# Patient Record
Sex: FEMALE | Race: ASIAN | NOT HISPANIC OR LATINO | URBAN - METROPOLITAN AREA
[De-identification: names, ages, dates, MRNs, and addresses within clinical notes are randomized per-mention and may not be internally consistent; named-entity substitution may affect disease eponyms.]

---

## 2018-06-11 ENCOUNTER — APPOINTMENT (RX ONLY)
Dept: URBAN - METROPOLITAN AREA CLINIC 81 | Facility: CLINIC | Age: 33
Setting detail: DERMATOLOGY
End: 2018-06-11

## 2018-06-11 DIAGNOSIS — L91.0 HYPERTROPHIC SCAR: ICD-10-CM

## 2018-06-11 PROBLEM — D23.12 OTHER BENIGN NEOPLASM OF SKIN OF LEFT EYELID, INCLUDING CANTHUS: Status: ACTIVE | Noted: 2018-06-11

## 2018-06-11 PROBLEM — D23.39 OTHER BENIGN NEOPLASM OF SKIN OF OTHER PARTS OF FACE: Status: ACTIVE | Noted: 2018-06-11

## 2018-06-11 PROBLEM — D23.5 OTHER BENIGN NEOPLASM OF SKIN OF TRUNK: Status: ACTIVE | Noted: 2018-06-11

## 2018-06-11 PROBLEM — L85.3 XEROSIS CUTIS: Status: ACTIVE | Noted: 2018-06-11

## 2018-06-11 PROBLEM — F41.9 ANXIETY DISORDER, UNSPECIFIED: Status: ACTIVE | Noted: 2018-06-11

## 2018-06-11 PROCEDURE — ? ADDITIONAL NOTES

## 2018-06-11 PROCEDURE — 99203 OFFICE O/P NEW LOW 30 MIN: CPT

## 2018-06-11 PROCEDURE — ? COUNSELING

## 2018-06-11 ASSESSMENT — LOCATION ZONE DERM: LOCATION ZONE: ARM

## 2018-06-11 ASSESSMENT — LOCATION SIMPLE DESCRIPTION DERM: LOCATION SIMPLE: LEFT SHOULDER

## 2018-06-11 ASSESSMENT — LOCATION DETAILED DESCRIPTION DERM: LOCATION DETAILED: LEFT ANTERIOR SHOULDER

## 2018-06-11 NOTE — PROCEDURE: ADDITIONAL NOTES
Additional Notes: Lesion is 0.8cm.  Appears benign. No atypia.  1st noticed 1 year ago. \\n\\nDiscussed bx. Pt opted to watch and wait due to keloid history.\\n\\nPt to f/u if changes.

## 2022-03-22 ENCOUNTER — APPOINTMENT (RX ONLY)
Dept: URBAN - METROPOLITAN AREA CLINIC 80 | Facility: CLINIC | Age: 37
Setting detail: DERMATOLOGY
End: 2022-03-22

## 2022-03-22 DIAGNOSIS — L81.4 OTHER MELANIN HYPERPIGMENTATION: ICD-10-CM

## 2022-03-22 DIAGNOSIS — D22 MELANOCYTIC NEVI: ICD-10-CM

## 2022-03-22 DIAGNOSIS — L82.1 OTHER SEBORRHEIC KERATOSIS: ICD-10-CM

## 2022-03-22 DIAGNOSIS — D18.0 HEMANGIOMA: ICD-10-CM

## 2022-03-22 PROBLEM — D18.01 HEMANGIOMA OF SKIN AND SUBCUTANEOUS TISSUE: Status: ACTIVE | Noted: 2022-03-22

## 2022-03-22 PROBLEM — D22.5 MELANOCYTIC NEVI OF TRUNK: Status: ACTIVE | Noted: 2022-03-22

## 2022-03-22 PROBLEM — D22.39 MELANOCYTIC NEVI OF OTHER PARTS OF FACE: Status: ACTIVE | Noted: 2022-03-22

## 2022-03-22 PROCEDURE — 99203 OFFICE O/P NEW LOW 30 MIN: CPT

## 2022-03-22 PROCEDURE — ? SUNSCREEN TREATMENT REGIMEN

## 2022-03-22 PROCEDURE — ? COUNSELING

## 2022-03-22 ASSESSMENT — LOCATION DETAILED DESCRIPTION DERM: LOCATION DETAILED: LEFT CENTRAL MALAR CHEEK

## 2022-03-22 ASSESSMENT — LOCATION SIMPLE DESCRIPTION DERM: LOCATION SIMPLE: LEFT CHEEK

## 2022-03-22 ASSESSMENT — LOCATION ZONE DERM: LOCATION ZONE: FACE

## 2022-03-22 NOTE — PROCEDURE: MIPS QUALITY
Quality 226: Preventive Care And Screening: Tobacco Use: Screening And Cessation Intervention: Patient screened for tobacco use and is an ex/non-smoker
Quality 431: Preventive Care And Screening: Unhealthy Alcohol Use - Screening: Patient not identified as an unhealthy alcohol user when screened for unhealthy alcohol use using a systematic screening method
Detail Level: Simple

## 2022-09-27 ENCOUNTER — APPOINTMENT (RX ONLY)
Dept: URBAN - METROPOLITAN AREA CLINIC 80 | Facility: CLINIC | Age: 37
Setting detail: DERMATOLOGY
End: 2022-09-27

## 2022-09-27 DIAGNOSIS — L91.0 HYPERTROPHIC SCAR: ICD-10-CM

## 2022-09-27 DIAGNOSIS — L82.1 OTHER SEBORRHEIC KERATOSIS: ICD-10-CM

## 2022-09-27 DIAGNOSIS — Q828 OTHER SPECIFIED ANOMALIES OF SKIN: ICD-10-CM

## 2022-09-27 DIAGNOSIS — D18.0 HEMANGIOMA: ICD-10-CM

## 2022-09-27 DIAGNOSIS — L81.4 OTHER MELANIN HYPERPIGMENTATION: ICD-10-CM

## 2022-09-27 DIAGNOSIS — D22 MELANOCYTIC NEVI: ICD-10-CM

## 2022-09-27 DIAGNOSIS — Q819 OTHER SPECIFIED ANOMALIES OF SKIN: ICD-10-CM

## 2022-09-27 DIAGNOSIS — Q826 OTHER SPECIFIED ANOMALIES OF SKIN: ICD-10-CM

## 2022-09-27 PROBLEM — L85.8 OTHER SPECIFIED EPIDERMAL THICKENING: Status: ACTIVE | Noted: 2022-09-27

## 2022-09-27 PROBLEM — D22.5 MELANOCYTIC NEVI OF TRUNK: Status: ACTIVE | Noted: 2022-09-27

## 2022-09-27 PROBLEM — D22.39 MELANOCYTIC NEVI OF OTHER PARTS OF FACE: Status: ACTIVE | Noted: 2022-09-27

## 2022-09-27 PROBLEM — D18.01 HEMANGIOMA OF SKIN AND SUBCUTANEOUS TISSUE: Status: ACTIVE | Noted: 2022-09-27

## 2022-09-27 PROCEDURE — ? PRODUCT LINE (OFFICE PRODUCTS)

## 2022-09-27 PROCEDURE — ? COUNSELING

## 2022-09-27 PROCEDURE — ? ADDITIONAL NOTES

## 2022-09-27 PROCEDURE — 99213 OFFICE O/P EST LOW 20 MIN: CPT

## 2022-09-27 PROCEDURE — ? TREATMENT REGIMEN

## 2022-09-27 PROCEDURE — ? FULL BODY SKIN EXAM - DECLINED

## 2022-09-27 ASSESSMENT — LOCATION SIMPLE DESCRIPTION DERM
LOCATION SIMPLE: RIGHT POSTERIOR UPPER ARM
LOCATION SIMPLE: LEFT SHOULDER
LOCATION SIMPLE: UPPER BACK
LOCATION SIMPLE: LEFT POSTERIOR UPPER ARM
LOCATION SIMPLE: ABDOMEN
LOCATION SIMPLE: LEFT CHEEK
LOCATION SIMPLE: CHEST

## 2022-09-27 ASSESSMENT — LOCATION DETAILED DESCRIPTION DERM
LOCATION DETAILED: LEFT PROXIMAL POSTERIOR UPPER ARM
LOCATION DETAILED: EPIGASTRIC SKIN
LOCATION DETAILED: LOWER STERNUM
LOCATION DETAILED: RIGHT PROXIMAL POSTERIOR UPPER ARM
LOCATION DETAILED: SUPERIOR THORACIC SPINE
LOCATION DETAILED: LEFT INFERIOR MEDIAL MALAR CHEEK
LOCATION DETAILED: LEFT ANTERIOR SHOULDER

## 2022-09-27 ASSESSMENT — LOCATION ZONE DERM
LOCATION ZONE: ARM
LOCATION ZONE: FACE
LOCATION ZONE: TRUNK

## 2022-09-27 NOTE — PROCEDURE: ADDITIONAL NOTES
Detail Level: Simple
Render Risk Assessment In Note?: no
Additional Notes: Scar from vaccination and scratching at it from when pt was younger

## 2022-09-27 NOTE — PROCEDURE: PRODUCT LINE (OFFICE PRODUCTS)
Product 10 Units: 0
Product 35 Price (In Dollars - Numeric Only, No Special Characters Or $): 0.00
Name Of Product 16: Avene Retrinal 0.1
Product 5 Application Directions: Use twice weekly as directed
Product 10 Application Directions: Apply daily to the face as directed
Product 16 Price (In Dollars - Numeric Only, No Special Characters Or $): 69.00
Product 1 Price (In Dollars - Numeric Only, No Special Characters Or $): 26.00
Product 11 Application Directions: Apply to face daily as directed
Name Of Product 11: R Essentials 15% AHA cream
Name Of Product 21: R Essentials Glycolic Body Lotion
Name Of Product 12: R Essentials 20% AHA cream
Product 5 Price (In Dollars - Numeric Only, No Special Characters Or $): 28.00
Name Of Product 1: R Essentials 10% BP cleanser
Product 16 Application Directions: Apply at bedtime as directed
Product 21 Price (In Dollars - Numeric Only, No Special Characters Or $): 43.00
Product 12 Price (In Dollars - Numeric Only, No Special Characters Or $): 42.50
Name Of Product 5: R Essentials 10% AHA scalp/body shampoo
Product 11 Price (In Dollars - Numeric Only, No Special Characters Or $): 36.00
Name Of Product 17: Glytone Brightening Complex
Product 21 Units: 1
Product 4 Application Directions: Use twice daily as directed
Render Product Pricing In Note: No
Product 12 Application Directions: Apply daily to face as directed
Product 17 Price (In Dollars - Numeric Only, No Special Characters Or $): 70.00
Name Of Product 13: Avene Antirougeurs cleansing milk (redness relief)
Product 4 Price (In Dollars - Numeric Only, No Special Characters Or $): 19.00
Send Charges To Patient Encounter: Yes
Product 8 Application Directions: Apply as needed for dry skin
Product 17 Application Directions: Apply twice daily to dark spots
Name Of Product 18: R Essentials Antioxidant Infused Gentle Foamy Cleanser
Product 8 Price (In Dollars - Numeric Only, No Special Characters Or $): 28.50
Product 18 Price (In Dollars - Numeric Only, No Special Characters Or $): 33.00
Name Of Product 8: R Essentials Ultra-moisture cream
Name Of Product 14: Avene Antirougeurs Day (redness relief soothing cream with SPF)
Product 3 Price (In Dollars - Numeric Only, No Special Characters Or $): 20.00
Risk Of Complication Category: No MDM
Product 18 Application Directions: Use daily as directed
Name Of Product 3: R Essentials 5% BP Cleanser
Name Of Product 9: R Essentials Lite moisture cream
Name Of Product 19: R Essentials 5% BP Gel
Product 2 Application Directions: Apply once daily as directed
Assigning Risk Information: Per AMA, level of risk is based upon consequences of the problem(s) addressed at the encounter when appropriately treated. Risk also includes medical decision making related to the need to initiate or forego further testing, treatment and/or hospitalization. Over the counter medication are assigned a risk level of low. Prescription medication management is assigned a risk level of moderate.
Product 9 Price (In Dollars - Numeric Only, No Special Characters Or $): 34.00
Product 14 Application Directions: Apply every morning as directed
Product 19 Price (In Dollars - Numeric Only, No Special Characters Or $): 16.00
Name Of Product 7: R Essentials 10% AHA gentle foaming cleanser
Name Of Product 15: Avene Antirougeurs Fort (relief concentrate)
Detail Level: Zone
Product 6 Application Directions: Use every morning as directed
Product 2 Price (In Dollars - Numeric Only, No Special Characters Or $): 21.00
Product 15 Price (In Dollars - Numeric Only, No Special Characters Or $): 48.00
Name Of Product 2: R Essentials 10% BP Gel
Name Of Product 10: R Essentials 10% AHA cream
Product 6 Price (In Dollars - Numeric Only, No Special Characters Or $): 42.00
Product 10 Price (In Dollars - Numeric Only, No Special Characters Or $): 31.00
Product 15 Application Directions: Apply twice daily as directed
Name Of Product 6: R Essentials Ultra-antioxidant sunscreen 50+
Product 1 Application Directions: Use two times daily as directed